# Patient Record
(demographics unavailable — no encounter records)

---

## 2024-12-05 NOTE — HISTORY OF PRESENT ILLNESS
[de-identified] : Ms. Abril Euceda is 76 year old female referred by Dr. Jermaine Barth for lymphadenopathy.\par  \par  Patient with no past medical history with intermittent left flank pain that's worse with laying down in Dec 2020, followed up witth Dr. Sanchez in Jan 2021 for a wellness check up and normal findings in labs and PE. Pain persisted so patient went into [facility] on 2/5/21 with reproducible left flank pain and CT scan and biopsy as below:\par  \par  2/5/21 Ct of abdomen/pelvis - Multiple pathologically enlarged retroperitoneal lymph nodes. The largest measures 2.2 in diameter. 1.1 cm periaortic lymph node of unclear etiology. There is also a 1.4 cm short axis lymph node of unclear etiology. \par  2/9/21  - 11\par  2/23/21  CT-guided core biopsies of retroperitoneal enlarged, pathologic lymph node.\par  Atypical Lymphoid proliferation\par  Note: Increased B cells mostly associated with disrupted germinal centers, and scattered large cells. Flow cytometry reveals no evidence of T cell and B cell clonality.  The histologic and immunophenotypic findings show no definitive evidence of lymphoproliferative disorder but are atypical.\par  \par  Flow cytometry: heterogeneous population of T cells (with normal CD4 to CD8 ratio, including a CD3+ CD57+subset) and polytypic B cells. \par  Patient then followed up with her GYN on 3/4/21 Dr. Mendelowtiz with normal pap and vaginal US, advised to repeat US in 4 months.  \par  Mammogram - Jan 2021 - normal findings\par  Colonoscopy - May 2013 - was due for one last year but postpone due to Covid pandemic. \par  FHx: Brother with brain cancer at age 58. \par  \par  Still persistent left flank pain with laying down but pain has lessen since January.  Patient with history of endometrial fibroids and cervical polypectomy. \par  \par  PET/CT (3/27/21)\par  Hypermetabolic retroperitoneal adenopathy, suspicious for malignancy, possibly lymphoma.\par  Mild heterogeneous bone marrow uptake, nonspecific, probably reactive versus possibly lymphoproliferative.\par  Probably benign 5.3 cm left ovarian cyst. Reevaluated follow-up.\par  \par  s/p  Laparoscopic retroperitoneal lymph node dissection with Dr Mtz (4/14/21)\par  \par  Pathology:\par  B-cell lymphoma\par  morphologic and immunohistochemical findings are most consistent\par  with a B-cell lymphoma, favor omari marginal zone lymphoma with\par  increased larger cells. However, molecular studies for gene\par  rearrangement are pending.\par  \par  PET/CT (4/14/21)\par  Interval resolution of FDG uptake and decreased size of abdominopelvic adenopathy, in keeping with\par  history of lymphoma, with treatment response\par  Interval decrease in size and resolution of FDG uptake in\par  abdominopelvic adenopathy. For example:\par  Retrocaval now punctate and no longer FDG-avid, previously 2.7 x 1.7 cm, SUV 5.6\par  Right common iliac 1.6 x 1.0 cm, no longer FDG-avid, similar to blood pool (SUV 1.9), previously 2.1 x 1.2\par  cm, SUV 4.3  [de-identified] : She is seen today for follow up  s/p rituximab #4/4 (5/12/21-6/2/21)  She feels good overall Denies of any B symptoms or palpable adenopathy.   CT C/A/P (11/6/23) No evidence of disease recurrence. No areas of adenopathy. The previously noted para-aortic and para iliac lymph nodes which had slightly increased but were not pathologically enlarged on the previous study, are either stable or have decreased in size

## 2024-12-05 NOTE — ASSESSMENT
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 The molecular findings raise concern for a lymphoproliferative disorder PET/CT shows retroperitoneal and right common iliac LN s/p Laparoscopic retroperitoneal lymph node dissection with Dr Mtz (4/14/21) Path: B cell lymphoma. Most likely marginal zone lymphoma Opted for weekly rituximab and further treatment based on response s/p rituximab #4/4 (5/12/21-6/2/21) PET/CT (7/21)- complete response 7/2022 - Interval improvement in retroperitoneal lymphadenopathy. Residual mildly enlarged retroperitoneal LNs.  She is seen today for follow up Overall doing well No B symptoms Restaging CT shows YISSEL Labs ordered, drawn in the office, reviewed, analyzed and discussed Normal LDH Will now consider imaging PRN Advised to monitor for B symptoms or any palpable LN  # Ovarian cyst Saw Dr Mendelowitz Had pelvic ultrasound/ TV ultrasound -Dominant simple left ovarian cyst Repeat USG in 6 months showed stable findings. She was advised against further imaging by Dr Mendelowitz  #Breast mass on PET 1/2022 normal mammgoram  Family ho cancer Brother- GBM in his 50s No other family ho cancer Not a candidate for genetic testing  Patient had multiple questions which were answered to satisfaction  Follow up in 12 months or sooner if needed CBC, CMP, LDH.

## 2024-12-05 NOTE — REVIEW OF SYSTEMS
[Negative] : Allergic/Immunologic [FreeTextEntry2] : 10 point review of systems negative except as outlined in HPI [FreeTextEntry9] : +left back pain

## 2024-12-12 NOTE — HISTORY OF PRESENT ILLNESS
[de-identified] : Ms. Abril Euceda is 76 year old female referred by Dr. Jermaine Barth for lymphadenopathy.\par  \par  Patient with no past medical history with intermittent left flank pain that's worse with laying down in Dec 2020, followed up witth Dr. aSnchez in Jan 2021 for a wellness check up and normal findings in labs and PE. Pain persisted so patient went into [facility] on 2/5/21 with reproducible left flank pain and CT scan and biopsy as below:\par  \par  2/5/21 Ct of abdomen/pelvis - Multiple pathologically enlarged retroperitoneal lymph nodes. The largest measures 2.2 in diameter. 1.1 cm periaortic lymph node of unclear etiology. There is also a 1.4 cm short axis lymph node of unclear etiology. \par  2/9/21  - 11\par  2/23/21  CT-guided core biopsies of retroperitoneal enlarged, pathologic lymph node.\par  Atypical Lymphoid proliferation\par  Note: Increased B cells mostly associated with disrupted germinal centers, and scattered large cells. Flow cytometry reveals no evidence of T cell and B cell clonality.  The histologic and immunophenotypic findings show no definitive evidence of lymphoproliferative disorder but are atypical.\par  \par  Flow cytometry: heterogeneous population of T cells (with normal CD4 to CD8 ratio, including a CD3+ CD57+subset) and polytypic B cells. \par  Patient then followed up with her GYN on 3/4/21 Dr. Mendelowtiz with normal pap and vaginal US, advised to repeat US in 4 months.  \par  Mammogram - Jan 2021 - normal findings\par  Colonoscopy - May 2013 - was due for one last year but postpone due to Covid pandemic. \par  FHx: Brother with brain cancer at age 58. \par  \par  Still persistent left flank pain with laying down but pain has lessen since January.  Patient with history of endometrial fibroids and cervical polypectomy. \par  \par  PET/CT (3/27/21)\par  Hypermetabolic retroperitoneal adenopathy, suspicious for malignancy, possibly lymphoma.\par  Mild heterogeneous bone marrow uptake, nonspecific, probably reactive versus possibly lymphoproliferative.\par  Probably benign 5.3 cm left ovarian cyst. Reevaluated follow-up.\par  \par  s/p  Laparoscopic retroperitoneal lymph node dissection with Dr Mtz (4/14/21)\par  \par  Pathology:\par  B-cell lymphoma\par  morphologic and immunohistochemical findings are most consistent\par  with a B-cell lymphoma, favor omari marginal zone lymphoma with\par  increased larger cells. However, molecular studies for gene\par  rearrangement are pending.\par  \par  PET/CT (4/14/21)\par  Interval resolution of FDG uptake and decreased size of abdominopelvic adenopathy, in keeping with\par  history of lymphoma, with treatment response\par  Interval decrease in size and resolution of FDG uptake in\par  abdominopelvic adenopathy. For example:\par  Retrocaval now punctate and no longer FDG-avid, previously 2.7 x 1.7 cm, SUV 5.6\par  Right common iliac 1.6 x 1.0 cm, no longer FDG-avid, similar to blood pool (SUV 1.9), previously 2.1 x 1.2\par  cm, SUV 4.3  [de-identified] : She is seen today for follow up  s/p rituximab #4/4 (5/12/21-6/2/21)  Patient is clinically doing well and has no complaints.  Denies of having any B symptoms.

## 2024-12-12 NOTE — ASSESSMENT
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 The molecular findings raise concern for a lymphoproliferative disorder PET/CT shows retroperitoneal and right common iliac LN s/p Laparoscopic retroperitoneal lymph node dissection with Dr Mtz (4/14/21) Path: B cell lymphoma. Most likely marginal zone lymphoma Opted for weekly rituximab and further treatment based on response s/p rituximab #4/4 (5/12/21-6/2/21) PET/CT (7/21)- complete response 7/2022 - Interval improvement in retroperitoneal lymphadenopathy. Residual mildly enlarged retroperitoneal LNs.  patient is here for follow up clinically doing well and remaining very active Denies of having any B symptoms and no palpable LNs  -Labs are drawn in the office, reviewed, analyzed, and discussed Normal LDH  to continue with monitoring  # Ovarian cyst Saw Dr Mendelowitz Had pelvic ultrasound/ TV ultrasound -Dominant simple left ovarian cyst Repeat USG in 6 months showed stable findings. She was advised against further imaging by Dr Mendelowitz  #Breast mass on PET 1/2024 Mammogram   Family ho cancer Brother- GBM in his 50s No other family ho cancer Not a candidate for genetic testing  Patient had multiple questions which were answered to satisfaction  d/w Dr. Pritchard  Follow up in 12 months or sooner if needed CBC, CMP, LDH.

## 2024-12-12 NOTE — HISTORY OF PRESENT ILLNESS
[de-identified] : Ms. Abril Euceda is 76 year old female referred by Dr. Jermaine Barth for lymphadenopathy.\par  \par  Patient with no past medical history with intermittent left flank pain that's worse with laying down in Dec 2020, followed up witth Dr. Sanchez in Jan 2021 for a wellness check up and normal findings in labs and PE. Pain persisted so patient went into [facility] ER on 2/5/21 with reproducible left flank pain and CT scan and biopsy as below:\par  \par  2/5/21 Ct of abdomen/pelvis - Multiple pathologically enlarged retroperitoneal lymph nodes. The largest measures 2.2 in diameter. 1.1 cm periaortic lymph node of unclear etiology. There is also a 1.4 cm short axis lymph node of unclear etiology. \par  2/9/21  - 11\par  2/23/21  CT-guided core biopsies of retroperitoneal enlarged, pathologic lymph node.\par  Atypical Lymphoid proliferation\par  Note: Increased B cells mostly associated with disrupted germinal centers, and scattered large cells. Flow cytometry reveals no evidence of T cell and B cell clonality.  The histologic and immunophenotypic findings show no definitive evidence of lymphoproliferative disorder but are atypical.\par  \par  Flow cytometry: heterogeneous population of T cells (with normal CD4 to CD8 ratio, including a CD3+ CD57+subset) and polytypic B cells. \par  Patient then followed up with her GYN on 3/4/21 Dr. Mendelowtiz with normal pap and vaginal US, advised to repeat US in 4 months.  \par  Mammogram - Jan 2021 - normal findings\par  Colonoscopy - May 2013 - was due for one last year but postpone due to Covid pandemic. \par  FHx: Brother with brain cancer at age 58. \par  \par  Still persistent left flank pain with laying down but pain has lessen since January.  Patient with history of endometrial fibroids and cervical polypectomy. \par  \par  PET/CT (3/27/21)\par  Hypermetabolic retroperitoneal adenopathy, suspicious for malignancy, possibly lymphoma.\par  Mild heterogeneous bone marrow uptake, nonspecific, probably reactive versus possibly lymphoproliferative.\par  Probably benign 5.3 cm left ovarian cyst. Reevaluated follow-up.\par  \par  s/p  Laparoscopic retroperitoneal lymph node dissection with Dr Mtz (4/14/21)\par  \par  Pathology:\par  B-cell lymphoma\par  morphologic and immunohistochemical findings are most consistent\par  with a B-cell lymphoma, favor omari marginal zone lymphoma with\par  increased larger cells. However, molecular studies for gene\par  rearrangement are pending.\par  \par  PET/CT (4/14/21)\par  Interval resolution of FDG uptake and decreased size of abdominopelvic adenopathy, in keeping with\par  history of lymphoma, with treatment response\par  Interval decrease in size and resolution of FDG uptake in\par  abdominopelvic adenopathy. For example:\par  Retrocaval now punctate and no longer FDG-avid, previously 2.7 x 1.7 cm, SUV 5.6\par  Right common iliac 1.6 x 1.0 cm, no longer FDG-avid, similar to blood pool (SUV 1.9), previously 2.1 x 1.2\par  cm, SUV 4.3  [de-identified] : She is seen today for follow up  s/p rituximab #4/4 (5/12/21-6/2/21)  Patient is clinically doing well and has no complaints.  Denies of having any B symptoms.

## 2024-12-12 NOTE — HISTORY OF PRESENT ILLNESS
[de-identified] : Ms. Abril Euceda is 76 year old female referred by Dr. Jermaine Barth for lymphadenopathy.\par  \par  Patient with no past medical history with intermittent left flank pain that's worse with laying down in Dec 2020, followed up witth Dr. Sanchez in Jan 2021 for a wellness check up and normal findings in labs and PE. Pain persisted so patient went into [facility] ER on 2/5/21 with reproducible left flank pain and CT scan and biopsy as below:\par  \par  2/5/21 Ct of abdomen/pelvis - Multiple pathologically enlarged retroperitoneal lymph nodes. The largest measures 2.2 in diameter. 1.1 cm periaortic lymph node of unclear etiology. There is also a 1.4 cm short axis lymph node of unclear etiology. \par  2/9/21  - 11\par  2/23/21  CT-guided core biopsies of retroperitoneal enlarged, pathologic lymph node.\par  Atypical Lymphoid proliferation\par  Note: Increased B cells mostly associated with disrupted germinal centers, and scattered large cells. Flow cytometry reveals no evidence of T cell and B cell clonality.  The histologic and immunophenotypic findings show no definitive evidence of lymphoproliferative disorder but are atypical.\par  \par  Flow cytometry: heterogeneous population of T cells (with normal CD4 to CD8 ratio, including a CD3+ CD57+subset) and polytypic B cells. \par  Patient then followed up with her GYN on 3/4/21 Dr. Mendelowtiz with normal pap and vaginal US, advised to repeat US in 4 months.  \par  Mammogram - Jan 2021 - normal findings\par  Colonoscopy - May 2013 - was due for one last year but postpone due to Covid pandemic. \par  FHx: Brother with brain cancer at age 58. \par  \par  Still persistent left flank pain with laying down but pain has lessen since January.  Patient with history of endometrial fibroids and cervical polypectomy. \par  \par  PET/CT (3/27/21)\par  Hypermetabolic retroperitoneal adenopathy, suspicious for malignancy, possibly lymphoma.\par  Mild heterogeneous bone marrow uptake, nonspecific, probably reactive versus possibly lymphoproliferative.\par  Probably benign 5.3 cm left ovarian cyst. Reevaluated follow-up.\par  \par  s/p  Laparoscopic retroperitoneal lymph node dissection with Dr Mtz (4/14/21)\par  \par  Pathology:\par  B-cell lymphoma\par  morphologic and immunohistochemical findings are most consistent\par  with a B-cell lymphoma, favor omari marginal zone lymphoma with\par  increased larger cells. However, molecular studies for gene\par  rearrangement are pending.\par  \par  PET/CT (4/14/21)\par  Interval resolution of FDG uptake and decreased size of abdominopelvic adenopathy, in keeping with\par  history of lymphoma, with treatment response\par  Interval decrease in size and resolution of FDG uptake in\par  abdominopelvic adenopathy. For example:\par  Retrocaval now punctate and no longer FDG-avid, previously 2.7 x 1.7 cm, SUV 5.6\par  Right common iliac 1.6 x 1.0 cm, no longer FDG-avid, similar to blood pool (SUV 1.9), previously 2.1 x 1.2\par  cm, SUV 4.3  [de-identified] : She is seen today for follow up  s/p rituximab #4/4 (5/12/21-6/2/21)  Patient is clinically doing well and has no complaints.  Denies of having any B symptoms.

## 2024-12-12 NOTE — REVIEW OF SYSTEMS
[FreeTextEntry2] : 10 point review of systems negative except as outlined in HPI [FreeTextEntry9] : +left back pain

## 2024-12-17 NOTE — BEGINNING OF VISIT
[0] : 2) Feeling down, depressed, or hopeless: Not at all (0) [PHQ-2 Negative] : PHQ-2 Negative [PHQ-9 Negative] : PHQ-9 Negative [Pain Scale: ___] : On a scale of 1-10, today the patient's pain is a(n) [unfilled]. [Former] : Former [[id number]] : 0

## 2024-12-17 NOTE — BEGINNING OF VISIT
[0] : 2) Feeling down, depressed, or hopeless: Not at all (0) [PHQ-2 Negative] : PHQ-2 Negative [PHQ-9 Negative] : PHQ-9 Negative [Pain Scale: ___] : On a scale of 1-10, today the patient's pain is a(n) [unfilled]. [Former] : Former [[id number]] : 0

## 2024-12-17 NOTE — BEGINNING OF VISIT
[0] : 2) Feeling down, depressed, or hopeless: Not at all (0) [PHQ-2 Negative] : PHQ-2 Negative [PHQ-9 Negative] : PHQ-9 Negative [Pain Scale: ___] : On a scale of 1-10, today the patient's pain is a(n) [unfilled]. [Former] : Former [[id number]] : 0

## 2025-03-25 NOTE — HISTORY OF PRESENT ILLNESS
[TextBox_4] : Here for gyn care. Feels well. No gyn complaints; no PMB. Normal bladder & bowel function. Voids 1-2 x/nt. 2/5/21 CT of the abdomen and pelvis for evaluation of left flank pain: The kidneys showed no evidence of hydronephrosis or renal stone. The ureters were unremarkable. The bladder had no calcified stone. Multiple pathologically enlarged retroperitoneal lymph nodes were seen the largest measured 2.2 cm in diameter. Innumerable: Diverticuli were identified in the sigmoid colon. A large left adnexal cyst was seen measuring 5.3 x 4.0 x 5.0 cm. Ultrasound and  were recommended. The uterus was enlarged with multiple calcifications consistent with involuting fibroids. 2/9/21 Ca 125 - 11; wnl. 2/23/21 needle bx of lymph node - Dr. VENU Wells. Path: Increased B cells mostly associated with disrupted germinal centers, and scattered large cells.  No definitive evidence of lymphoproliferative disorder but are atypical. No bloating or bleeding; no pain. Colposcopy 11/15/04 (prior to HPV testing). 6/11/05 Cone bx of cx. Pt had annual paps done until at least 2009 (prior to EMR use - hence on paper chart). History of B-cell lymphoma. 7/13/21 PET scan was very favorable - see report.  9/16/21 pelvic, transvaginal ultrasound: Uterus 8.2 x 4.2 x 5.5 cm, retroverted. Multiple fibroids again identified including a 2.3 x 2.2 x 2.2 cm fundal intramural fibroid and calcified right subserosal 1.9 x 1.6 x 1.2 cm. Endometrium not well visualized. Right ovary: 2.8 x 1.8 x 1.5 cm. Within normal limits. Normal Doppler waveforms. Left ovary: 4.2 x 5.9 x 5.0 cm, containing a 5.1 at 4.3 x 4.1 cm simple-appearing cyst, not significantly changed. Also with normal Doppler waveforms within the surrounding ovarian parenchyma. A 6 month follow up is recommended to insure stability of the cyst. 7/13/22 CT of ch/abd/pel - stable 5 cm left adnexal cyst. 11/6/23 CT showed a stable 5.8 cm left adnexal cyst. Pt did not follow through w/ planned colonoscopy but, after a discussion, would prefer to have Cologuard. Arthritis in both knees - has had gel shots. 3/27/25: 1/8/2024 Cologuard: Negative  [Mammogramdate] : 1/11/2023 [TextBox_19] : Nerissa lifetime risk: 1.8%.  Scattered fibroglandular density with no evidence of malignancy.  IRMA: Grade 0. [PapSmeardate] : 3/4/21 [TextBox_31] : neg; HPV neg [BoneDensityDate] : 4/20/23 [TextBox_37] : T -1.9 at elbow 2-4 (Z +0.8) representing a 2.1% decline versus previous DEXA on 1/7/2020.  Right femoral neck -1.7; right hip -1.1 representing a 1.4% decline versus previous.  Osteopenia with a 10-year fracture risk of 13 and 3.0% (previously 11 and 2.4%). Results d/w pt incl mngmnt options, risks & benefits. 2/2023 vit D - 46  [ColonoscopyDate] : 5/2013 [TextBox_43] : Negative. Repeat is planned w/ Dr. VASILE Euceda for early 2023

## 2025-03-25 NOTE — PHYSICAL EXAM
[Chaperone Present] : A chaperone was present in the examining room during all aspects of the physical examination [No Lymphadenopathy] : no lymphadenopathy [Soft] : soft [Non-tender] : non-tender [No HSM] : No HSM [No Mass] : no mass [FreeTextEntry3] : no thyroid nodules [Examination Of The Breasts] : a normal appearance [No Masses] : no breast masses were palpable [Vulvar Atrophy] : vulvar atrophy [Labia Majora] : normal [Labia Minora] : normal [Atrophy] : atrophy [Normal] : normal [Enlarged ___ wks] : not enlarged [Uterine Adnexae] : normal [Nl Sphincter Tone] : normal sphincter tone [FreeTextEntry4] : <2 FB introitus [FreeTextEntry5] : atrophic [FreeTextEntry6] : seems nl size;  [FreeTextEntry9] : thickening in L pelvis likely due to sigmoid diverticulosis; cyst not felt - no change - 12/7/23

## 2025-04-30 NOTE — HISTORY OF PRESENT ILLNESS
[de-identified] : Patient is a 80F with B cell lymphoma (s/p rituximab), ovarian cyst (followed by Dr. Mendelowitz) presents today for  Wellness  Feels well without complaints today  Abril is physically active (aerobic and resistance) and eats a healthy diet walking and stairs  Onc: Dr. Pritchard gyn: Dr. Mendelowitz eye: Dr. Rubin (s/p cataracts)  1/8/24 cologuard negative  recently  (3 weeks ago) sudden and unexpected. In shock and grieving as expected with trouble sleeping

## 2025-04-30 NOTE — HEALTH RISK ASSESSMENT
[No] : In the past 12 months have you used drugs other than those required for medical reasons? No [2] : 2) Feeling down, depressed, or hopeless for more than half of the days (2) [PHQ-2 Negative - No further assessment needed] : PHQ-2 Negative - No further assessment needed [Patient reported mammogram was normal] : Patient reported mammogram was normal [Patient reported PAP Smear was normal] : Patient reported PAP Smear was normal [Patient reported bone density results were abnormal] : Patient reported bone density results were abnormal [Patient reported colonoscopy was abnormal] : Patient reported colonoscopy was abnormal [With Patient/Caregiver] : , with patient/caregiver [1] : 2) Feeling down, depressed, or hopeless for several days (1) [Audit-CScore] : 0 [de-identified] : PATIENT WALKS  [de-identified] : REGULAR DIET [[id number]] : 2 [Change in mental status noted] : No change in mental status noted [Language] : denies difficulty with language [Behavior] : denies difficulty with behavior [Learning/Retaining New Information] : denies difficulty learning/retaining new information [Handling Complex Tasks] : denies difficulty handling complex tasks [Reasoning] : denies difficulty with reasoning [Spatial Ability and Orientation] : denies difficulty with spatial ability and orientation [Sexually Active] : not sexually active [High Risk Behavior] : no high risk behavior [Reports changes in hearing] : Reports no changes in hearing [Reports changes in vision] : Reports no changes in vision [Reports normal functional visual acuity (ie: able to read med bottle)] : Reports poor functional visual acuity.  [Reports changes in dental health] : Reports no changes in dental health [Travel to Developing Areas] : does not  travel to developing areas [TB Exposure] : is not being exposed to tuberculosis [Caregiver Concerns] : does not have caregiver concerns [MammogramDate] : 01/25 [PapSmearDate] : 12/23 [BoneDensityDate] : 04/23 [BoneDensityComments] : osteopenia [ColonoscopyDate] : 01/24 [ColonoscopyComments] : COLOGUARD NEGATIVE [AdvancecareDate] : 04/25

## 2025-04-30 NOTE — HEALTH RISK ASSESSMENT
[No] : In the past 12 months have you used drugs other than those required for medical reasons? No [2] : 2) Feeling down, depressed, or hopeless for more than half of the days (2) [PHQ-2 Negative - No further assessment needed] : PHQ-2 Negative - No further assessment needed [Patient reported mammogram was normal] : Patient reported mammogram was normal [Patient reported PAP Smear was normal] : Patient reported PAP Smear was normal [Patient reported bone density results were abnormal] : Patient reported bone density results were abnormal [Patient reported colonoscopy was abnormal] : Patient reported colonoscopy was abnormal [With Patient/Caregiver] : , with patient/caregiver [1] : 2) Feeling down, depressed, or hopeless for several days (1) [Audit-CScore] : 0 [de-identified] : PATIENT WALKS  [de-identified] : REGULAR DIET [[id number]] : 2 [Change in mental status noted] : No change in mental status noted [Language] : denies difficulty with language [Behavior] : denies difficulty with behavior [Learning/Retaining New Information] : denies difficulty learning/retaining new information [Handling Complex Tasks] : denies difficulty handling complex tasks [Reasoning] : denies difficulty with reasoning [Spatial Ability and Orientation] : denies difficulty with spatial ability and orientation [Sexually Active] : not sexually active [High Risk Behavior] : no high risk behavior [Reports changes in hearing] : Reports no changes in hearing [Reports changes in vision] : Reports no changes in vision [Reports normal functional visual acuity (ie: able to read med bottle)] : Reports poor functional visual acuity.  [Reports changes in dental health] : Reports no changes in dental health [Travel to Developing Areas] : does not  travel to developing areas [TB Exposure] : is not being exposed to tuberculosis [Caregiver Concerns] : does not have caregiver concerns [MammogramDate] : 01/25 [PapSmearDate] : 12/23 [BoneDensityDate] : 04/23 [BoneDensityComments] : osteopenia [ColonoscopyDate] : 01/24 [ColonoscopyComments] : COLOGUARD NEGATIVE [AdvancecareDate] : 04/25

## 2025-04-30 NOTE — HISTORY OF PRESENT ILLNESS
[de-identified] : Patient is a 80F with B cell lymphoma (s/p rituximab), ovarian cyst (followed by Dr. Mendelowitz) presents today for  Wellness  Feels well without complaints today  Abril is physically active (aerobic and resistance) and eats a healthy diet walking and stairs  Onc: Dr. Pritchard gyn: Dr. Mendelowitz eye: Dr. Rubin (s/p cataracts)  1/8/24 cologuard negative  recently  (3 weeks ago) sudden and unexpected. In shock and grieving as expected with trouble sleeping

## 2025-04-30 NOTE — ASSESSMENT
[Vaccines Reviewed] : Immunizations reviewed today. Please see immunization details in the vaccine log within the immunization flowsheet.  [FreeTextEntry1] : up to date with colon cancer screening, mammo Due for BD, rx given Recommended vaccines: shingles, RSV, Tdap at pharmacy

## 2025-07-08 NOTE — HISTORY OF PRESENT ILLNESS
[FreeTextEntry8] : Patient is a 81F with B cell lymphoma (s/p rituximab), ovarian cyst (followed by Dr. Mendelowitz) presents today for follow up of anxiety and insomnia Recently  in April 2025 and has been struggling with anxiety and insomnia  Seeing Ada (therapist weekly) at [facility] Trying to get into bereavement group but having trouble finding one that suits her needs  We have tried multiple agents for sleep in the last few months without good success. Trazodone was working in the beginning. Took 1 then started to take 1.5tabs when 1 tab was not working that well and found it made her very groggy the next day Took sunday night took mirtazepine 7.5mg and that made her very groggy the next day as well. Same situation with Alprazolam 0.5mg I prescribed Doxepin, but has not tried that  lost 20 lbs -decreased appetite  Am - cottage cheese with fruit and english muffin lunch - peanut butter and Jelly and ensure dinner- chicken with broccoli